# Patient Record
Sex: MALE | Race: WHITE
[De-identification: names, ages, dates, MRNs, and addresses within clinical notes are randomized per-mention and may not be internally consistent; named-entity substitution may affect disease eponyms.]

---

## 2022-09-05 ENCOUNTER — HOSPITAL ENCOUNTER (EMERGENCY)
Dept: HOSPITAL 75 - ER | Age: 52
Discharge: HOME | End: 2022-09-05
Payer: SELF-PAY

## 2022-09-05 VITALS — BODY MASS INDEX: 30.41 KG/M2 | HEIGHT: 67.99 IN | WEIGHT: 200.62 LBS

## 2022-09-05 VITALS — SYSTOLIC BLOOD PRESSURE: 160 MMHG | DIASTOLIC BLOOD PRESSURE: 94 MMHG

## 2022-09-05 DIAGNOSIS — L02.214: Primary | ICD-10-CM

## 2022-09-05 DIAGNOSIS — F17.210: ICD-10-CM

## 2022-09-05 LAB
ALBUMIN SERPL-MCNC: 4.3 GM/DL (ref 3.2–4.5)
ALP SERPL-CCNC: 72 U/L (ref 40–136)
ALT SERPL-CCNC: 15 U/L (ref 0–55)
APTT BLD: 39 SEC (ref 24–35)
APTT PPP: YELLOW S
BACTERIA #/AREA URNS HPF: NEGATIVE /HPF
BASOPHILS # BLD AUTO: 0 10^3/UL (ref 0–0.1)
BASOPHILS NFR BLD AUTO: 0 % (ref 0–10)
BILIRUB SERPL-MCNC: 1.1 MG/DL (ref 0.1–1)
BILIRUB UR QL STRIP: NEGATIVE
BUN/CREAT SERPL: 7
CALCIUM SERPL-MCNC: 9.9 MG/DL (ref 8.5–10.1)
CHLORIDE SERPL-SCNC: 103 MMOL/L (ref 98–107)
CO2 SERPL-SCNC: 22 MMOL/L (ref 21–32)
CREAT SERPL-MCNC: 0.91 MG/DL (ref 0.6–1.3)
EOSINOPHIL # BLD AUTO: 0.1 10^3/UL (ref 0–0.3)
EOSINOPHIL NFR BLD AUTO: 1 % (ref 0–10)
EOSINOPHIL NFR BLD MANUAL: 1 %
FIBRINOGEN PPP-MCNC: CLEAR MG/DL
GFR SERPLBLD BASED ON 1.73 SQ M-ARVRAT: 101 ML/MIN
GLUCOSE SERPL-MCNC: 325 MG/DL (ref 70–105)
GLUCOSE UR STRIP-MCNC: (no result) MG/DL
GRAN CASTS #/AREA URNS LPF: (no result) /LPF
HCT VFR BLD CALC: 49 % (ref 40–54)
HGB BLD-MCNC: 17 G/DL (ref 13.3–17.7)
INR PPP: 0.9 (ref 0.8–1.4)
KETONES UR QL STRIP: NEGATIVE
LEUKOCYTE ESTERASE UR QL STRIP: NEGATIVE
LYMPHOCYTES # BLD AUTO: 2.5 10^3/UL (ref 1–4)
LYMPHOCYTES NFR BLD AUTO: 16 % (ref 12–44)
MANUAL DIFFERENTIAL PERFORMED BLD QL: YES
MCH RBC QN AUTO: 30 PG (ref 25–34)
MCHC RBC AUTO-ENTMCNC: 35 G/DL (ref 32–36)
MCV RBC AUTO: 87 FL (ref 80–99)
MONOCYTES # BLD AUTO: 1.6 10^3/UL (ref 0–1)
MONOCYTES NFR BLD AUTO: 10 % (ref 0–12)
MONOCYTES NFR BLD: 10 %
NEUTROPHILS # BLD AUTO: 11.4 10^3/UL (ref 1.8–7.8)
NEUTROPHILS NFR BLD AUTO: 73 % (ref 42–75)
NEUTS BAND NFR BLD MANUAL: 71 %
NITRITE UR QL STRIP: NEGATIVE
PH UR STRIP: 6 [PH] (ref 5–9)
PLATELET # BLD: 179 10^3/UL (ref 130–400)
PMV BLD AUTO: 11.8 FL (ref 9–12.2)
POTASSIUM SERPL-SCNC: 4.1 MMOL/L (ref 3.6–5)
PROT SERPL-MCNC: 7.6 GM/DL (ref 6.4–8.2)
PROT UR QL STRIP: NEGATIVE
PROTHROMBIN TIME: 12.9 SEC (ref 12.2–14.7)
RBC #/AREA URNS HPF: (no result) /HPF
RBC MORPH BLD: NORMAL
SODIUM SERPL-SCNC: 137 MMOL/L (ref 135–145)
SP GR UR STRIP: 1.02 (ref 1.02–1.02)
VARIANT LYMPHS NFR BLD MANUAL: 18 %
WBC # BLD AUTO: 15.7 10^3/UL (ref 4.3–11)
WBC #/AREA URNS HPF: (no result) /HPF

## 2022-09-05 PROCEDURE — 85027 COMPLETE CBC AUTOMATED: CPT

## 2022-09-05 PROCEDURE — 36415 COLL VENOUS BLD VENIPUNCTURE: CPT

## 2022-09-05 PROCEDURE — 85730 THROMBOPLASTIN TIME PARTIAL: CPT

## 2022-09-05 PROCEDURE — 85610 PROTHROMBIN TIME: CPT

## 2022-09-05 PROCEDURE — 87088 URINE BACTERIA CULTURE: CPT

## 2022-09-05 PROCEDURE — 87040 BLOOD CULTURE FOR BACTERIA: CPT

## 2022-09-05 PROCEDURE — 87070 CULTURE OTHR SPECIMN AEROBIC: CPT

## 2022-09-05 PROCEDURE — 74177 CT ABD & PELVIS W/CONTRAST: CPT

## 2022-09-05 PROCEDURE — 83605 ASSAY OF LACTIC ACID: CPT

## 2022-09-05 PROCEDURE — 85007 BL SMEAR W/DIFF WBC COUNT: CPT

## 2022-09-05 PROCEDURE — 87205 SMEAR GRAM STAIN: CPT

## 2022-09-05 PROCEDURE — 80053 COMPREHEN METABOLIC PANEL: CPT

## 2022-09-05 PROCEDURE — 81000 URINALYSIS NONAUTO W/SCOPE: CPT

## 2022-09-05 PROCEDURE — 83036 HEMOGLOBIN GLYCOSYLATED A1C: CPT

## 2022-09-05 NOTE — ED INTEGUMENTARY GENERAL
General


Chief Complaint:  Skin/Wound Problems


Stated Complaint:  SORE ON GROIN


Nursing Triage Note:  


PT AMB TO [facility] WITH C/O POSS ABSCESS OR BOIL ON L GROIN AREA THAT HE NOTICED 4 


DAYS AGO. PT SAID HE HAS HAD ONE BEFORE THAT NEEDED LANCED.


Source:  patient


Exam Limitations:  no limitations





History of Present Illness


Date Seen by Provider:  Sep 5, 2022


Time Seen by Provider:  18:30





Allergies and Home Medications


Allergies


Coded Allergies:  


     No Known Drug Allergies (Unverified , 9/5/22)





Past Medical-Social-Family Hx


Patient Social History


Tobacco Use?:  Yes


Tobacco type used:  Cigarettes


Smoking Status:  Current Everyday Smoker


Substance use?:  Yes


Substance type:  Marijuana


Substance frequency:  Once in a while


Alcohol Use?:  No


Pt feels they are or have been:  No





Immunizations Up To Date


Influenza Vaccine Up-to-Date:  Yes; Up-to-Date


First/Initial COVID19 Vaccinat:  2021


Second COVID19 Vaccination Pasha:  2021


Third COVID19 Vaccination Date:  2021





Past Medical History


Surgery/Hospitalization HX:  


DENIES





Physical Exam


Vital Signs





Vital Signs - First Documented








 9/5/22





 17:42


 


Temp 37.6


 


Pulse 97


 


Resp 18


 


B/P (MAP) 168/110 (129)





Capillary Refill :





Progress/Results/Core Measures


Results/Orders


Lab Results





Laboratory Tests








Test


 9/5/22


19:16 9/5/22


19:26 Range/Units


 


 


Urine Color YELLOW    


 


Urine Clarity CLEAR    


 


Urine pH 6.0   5-9  


 


Urine Specific Gravity 1.025 H  1.016-1.022  


 


Urine Protein NEGATIVE   NEGATIVE  


 


Urine Glucose (UA) 3+ H  NEGATIVE  


 


Urine Ketones NEGATIVE   NEGATIVE  


 


Urine Nitrite NEGATIVE   NEGATIVE  


 


Urine Bilirubin NEGATIVE   NEGATIVE  


 


Urine Urobilinogen 0.2   < = 1.0  MG/DL


 


Urine Leukocyte Esterase NEGATIVE   NEGATIVE  


 


Urine RBC (Auto) TRACE-L H  NEGATIVE  


 


Urine RBC NONE    /HPF


 


Urine WBC NONE    /HPF


 


Urine Crystals NONE    /LPF


 


Urine Bacteria NEGATIVE    /HPF


 


Urine Casts PRESENT    /LPF


 


Urine Granular Casts RARE    /LPF


 


Urine Mucus NEGATIVE    /LPF


 


Urine Culture Indicated NO    


 


White Blood Count


 


 15.7 H


 4.3-11.0


10^3/uL


 


Red Blood Count


 


 5.64 H


 4.30-5.52


10^6/uL


 


Hemoglobin  17.0  13.3-17.7  g/dL


 


Hematocrit  49  40-54  %


 


Mean Corpuscular Volume  87  80-99  fL


 


Mean Corpuscular Hemoglobin  30  25-34  pg


 


Mean Corpuscular Hemoglobin


Concent 


 35 


 32-36  g/dL





 


Red Cell Distribution Width  12.8  10.0-14.5  %


 


Platelet Count


 


 179 


 130-400


10^3/uL


 


Mean Platelet Volume  11.8  9.0-12.2  fL


 


Immature Granulocyte % (Auto)  0   %


 


Neutrophils (%) (Auto)  73  42-75  %


 


Lymphocytes (%) (Auto)  16  12-44  %


 


Monocytes (%) (Auto)  10  0-12  %


 


Eosinophils (%) (Auto)  1  0-10  %


 


Basophils (%) (Auto)  0  0-10  %


 


Neutrophils # (Auto)


 


 11.4 H


 1.8-7.8


10^3/uL


 


Lymphocytes # (Auto)


 


 2.5 


 1.0-4.0


10^3/uL


 


Monocytes # (Auto)


 


 1.6 H


 0.0-1.0


10^3/uL


 


Eosinophils # (Auto)


 


 0.1 


 0.0-0.3


10^3/uL


 


Basophils # (Auto)


 


 0.0 


 0.0-0.1


10^3/uL


 


Immature Granulocyte # (Auto)


 


 0.1 


 0.0-0.1


10^3/uL


 


Neutrophils % (Manual)  71   %


 


Lymphocytes % (Manual)  18   %


 


Monocytes % (Manual)  10   %


 


Eosinophils % (Manual)  1   %


 


Smudge Cells  SLIGHT   


 


Blood Morphology Comment  NORMAL   


 


Prothrombin Time  12.9  12.2-14.7  SEC


 


INR Comment  0.9  0.8-1.4  


 


Activated Partial


Thromboplast Time 


 39 H


 24-35  SEC





 


Sodium Level  137  135-145  MMOL/L


 


Potassium Level  4.1  3.6-5.0  MMOL/L


 


Chloride Level  103    MMOL/L


 


Carbon Dioxide Level  22  21-32  MMOL/L


 


Anion Gap  12  5-14  MMOL/L


 


Blood Urea Nitrogen  6 L 7-18  MG/DL


 


Creatinine


 


 0.91 


 0.60-1.30


MG/DL


 


Estimat Glomerular Filtration


Rate 


 101 


  





 


BUN/Creatinine Ratio  7   


 


Glucose Level  325 H   MG/DL


 


Lactic Acid Level


 


 1.05 


 0.50-2.00


MMOL/L


 


Calcium Level  9.9  8.5-10.1  MG/DL


 


Corrected Calcium  9.7  8.5-10.1  MG/DL


 


Total Bilirubin  1.1 H 0.1-1.0  MG/DL


 


Aspartate Amino Transf


(AST/SGOT) 


 10 


 5-34  U/L





 


Alanine Aminotransferase


(ALT/SGPT) 


 15 


 0-55  U/L





 


Alkaline Phosphatase  72    U/L


 


Total Protein  7.6  6.4-8.2  GM/DL


 


Albumin  4.3  3.2-4.5  GM/DL








My Orders





Orders - MAYANK TORRES


Cbc With Automated Diff (9/5/22 19:00)


Comprehensive Metabolic Panel (9/5/22 19:00)


Blood Culture (9/5/22 19:00)


Sputum Culture (9/5/22 19:00)


Urinalysis (9/5/22 19:00)


Urine Culture (9/5/22 19:00)


Protime With Inr (9/5/22 19:00)


Partial Thromboplastin Time (9/5/22 19:00)


Ed Iv/Invasive Line Start (9/5/22 19:00)


O2 (9/5/22 19:00)


Lactic Acid Analyzer (9/5/22 19:00)


Fentanyl  Inj (Sublimaze Injection) (9/5/22 19:00)


Hemoglobin A1c (9/5/22 19:00)


Ct Abdomen/Pelvis W (9/5/22 19:01)


Iohexol Injection (Omnipaque 350 Mg/Ml 1 (9/5/22 19:15)


Received Contrast (Hold Metformin- Contr (9/5/22 19:15)


Sodium Chloride Flush (Catheter Flush Sy (9/5/22 19:15)


Manual Differential (9/5/22 19:26)


Lidocaine 1% Inj 20 Ml (Xylocaine 1% Inj (9/5/22 21:28)





Medications Given in ED





Current Medications








 Medications  Dose


 Ordered  Sig/Yohana


 Route  Start Time


 Stop Time Status Last Admin


Dose Admin


 


 Fentanyl Citrate  50 mcg  ONCE  ONCE


 IVP  9/5/22 19:00


 9/5/22 19:02 DC 9/5/22 19:51


50 MCG


 


 Iohexol  100 ml  ONCE  ONCE


 IV  9/5/22 19:15


 9/5/22 19:16 DC 9/5/22 19:39


80 ML


 


 Lidocaine HCl  20 ml  STK-MED ONCE


 .ROUTE  9/5/22 21:28


 9/5/22 21:30 DC 9/5/22 22:30


20 ML








Vital Signs/I&O











 9/5/22





 17:42


 


Temp 37.6


 


Pulse 97


 


Resp 18


 


B/P (MAP) 168/110 (129)














Blood Pressure Mean:                    129











Departure


Impression





   Primary Impression:  


   Groin abscess


Disposition:  01 HOME, SELF-CARE


Condition:  Improved





Departure-Patient Inst.


Decision time for Depature:  23:27


Patient Instructions:  Abscess Incision and Drainage





Add. Discharge Instructions:  


Plan: 


1. Establish with primary care provider of your choice for further treatment of 

your glucose. 


2. Allow site to drain well for next 24 hours, then you can remove packing. 


3. May take Hydrocodone 5/325mg by mouth every 6 hours as needed for pain, do 

not drive while taking. 


4. Take antibiotics as directed and complete full course even if you begin to 

feel better. 


5. Return to ER if you develop any increasing redness, pain, fever, chills, 

nausea, vomiting, abdominal pain.


6. Return to ER for any new, concerning, worsening symptoms.





All discharge instructions reviewed with patient and/or family. Voiced 

understanding.


Scripts


Sulfamethoxazole/Trimethoprim (Bactrim Ds Tablet) 800 Mg-160 Mg Tablet


1 EACH PO BID for 10 Days, #20 TAB 0 Refills


   Prov: MAYANK TORRES APRN         9/5/22 


Cephalexin (Cephalexin) 500 Mg Tablet


500 MG PO QID for 10 Days, #40 TAB 0 Refills


   Prov: MAYANK TORRES APRN         9/5/22











MAYANK TORRES APRN            Sep 5, 2022 18:30

## 2022-09-05 NOTE — DIAGNOSTIC IMAGING REPORT
CLINICAL INDICATION: Patient with possible abscess or boil on the

left groin area that he noticed 4 days ago. Patient has had one

before that needed lanced.



EXAM: Axial CT scan of the abdomen and pelvis performed with 80

mL of Omnipaque 350 IV contrast. Sagittal and coronal reformatted

images were created.



COMPARISON: None.



FINDINGS:

The visualized lung bases are clear. Bones show no significant

abnormality. The liver, spleen, pancreas, gallbladder, and

adrenal glands are unremarkable. There is an indeterminate 2.0 cm

exophytic lesion involving the inferior aspect of the right

kidney. This lesion has Hounsfield units of 61. Otherwise, both

kidneys are unremarkable. There is no hydronephrosis. Bladder is

fluid-filled with mild bladder wall thickening which is

nonspecific. This may be related to contraction. Prostate gland

is unremarkable. There is no intra-abdominal free air or free

fluid. There is no intestinal obstruction. The appendix is

unremarkable. There is no lymphadenopathy. There are small

bilateral fat-containing inguinal hernias. There is soft tissue

thickening and fat stranding involving the high left scrotal

region/peroneal region which is just posterior to the region of

the spermatic cord. There is a roughly 4.6 cm x 2.2 cm x 4.2 cm

slightly rounded area of low-density in the posterior high left

scrotal/perineal region with adjacent fat stranding. This may

represent a very early abscess. Part of the borders are slightly

rounded



There is no soft tissue air.



IMPRESSION:

1: There is soft tissue fat stranding and swelling in the high

posterior left scrotal/perineal region with a fluid collection in

the area which may represent a very early abscess given that part

of it has slightly rounded borders.



2: There is a 2 cm indeterminate exophytic lesion involving the

inferior aspect of the right kidney. Nonemergent renal ultrasound

would better evaluate.



Dictated by: 



  Dictated on workstation # [id number]